# Patient Record
Sex: MALE | Race: OTHER
[De-identification: names, ages, dates, MRNs, and addresses within clinical notes are randomized per-mention and may not be internally consistent; named-entity substitution may affect disease eponyms.]

---

## 2019-03-03 ENCOUNTER — HOSPITAL ENCOUNTER (EMERGENCY)
Dept: HOSPITAL 72 - EMR | Age: 34
Discharge: HOME | End: 2019-03-03
Payer: MEDICAID

## 2019-03-03 VITALS — BODY MASS INDEX: 26.66 KG/M2 | HEIGHT: 65 IN | WEIGHT: 160 LBS

## 2019-03-03 VITALS — SYSTOLIC BLOOD PRESSURE: 124 MMHG | DIASTOLIC BLOOD PRESSURE: 73 MMHG

## 2019-03-03 VITALS — SYSTOLIC BLOOD PRESSURE: 123 MMHG | DIASTOLIC BLOOD PRESSURE: 71 MMHG

## 2019-03-03 DIAGNOSIS — R21: Primary | ICD-10-CM

## 2019-03-03 PROCEDURE — 99282 EMERGENCY DEPT VISIT SF MDM: CPT

## 2019-03-03 NOTE — EMERGENCY ROOM REPORT
History of Present Illness


General


Chief Complaint:  Skin Rash/Abscess


Source:  Patient





Present Illness


HPI


Patient is a 33-year-old male who presented after skin rash.  Patient was noted 

to have developed a rash after ingesting his wife's Diflucan.  Patient denied 

any fever.  He had a recent upper respiratory infection.  Patient reports 

having a generalized itchiness to his trunk.  Patient had taken Benadryl with 

minimal change.Patient had onset of rash over the past 2 days.


Allergies:  


Coded Allergies:  


     No Known Allergies (Unverified , 3/3/19)





Patient History


Past Medical History:  see triage record


Reviewed Nursing Documentation:  PMH: Agreed; PSxH: Agreed





Nursing Documentation-PMH


Past Medical History:  No Stated History





Review of Systems


All Other Systems:  negative except mentioned in HPI





Physical Exam





Vital Signs








  Date Time  Temp Pulse Resp B/P (MAP) Pulse Ox O2 Delivery O2 Flow Rate FiO2


 


3/3/19 19:49 98.2 76 16 126/74 96 Room Air  








General Appearance:  well appearing, no apparent distress, alert, GCS 15


Head:  normocephalic, atraumatic


ENT:  hearing grossly normal, normal voice


Neck:  full range of motion, supple


Respiratory:  no respiratory distress, speaking full sentences


Musculoskeletal:  no calf tenderness


Neurologic:  normal inspection, alert, oriented x3, normal gait


Psychiatric:  mood/affect normal


Skin:  other - patchy rash to trunk without urticaria





Medical Decision Making


Diagnostic Impression:  


 Primary Impression:  


 Skin rash


ER Course


.  Patient presented for itchy skin rash.  Differential diagnosis include was 

not limited to viral exanthem, pityriasis rosea, scabies, among others.  

Patient has a benign exam and does not appear to require any further imaging or 

laboratory testing at this time Patient was given oral steroids as well as 

prescription for Benadryl.  He is advised to follow-up with his primary care 

physician for recheck.  He is to return if any worsening of condition.





Last Vital Signs








  Date Time  Temp Pulse Resp B/P (MAP) Pulse Ox O2 Delivery O2 Flow Rate FiO2


 


3/3/19 20:27 98.2 74 16 124/73 96 Room Air  








Status:  improved


Disposition:  HOME, SELF-CARE


Condition:  Stable


Scripts


Diphenhydramine Hcl* (BENADRYL*) 25 Mg Capsule


25 MG ORAL Q6H PRN for Itching, #30 CAP


   Prov: Dillon Garcia MD         3/3/19 


Prednisone* (PREDNISONE*) 20 Mg Tablet


40 MG ORAL DAILY, #10 TAB


   Prov: Dillon Garcia MD         3/3/19


Referrals:  


NON PHYSICIAN (PCP)


Patient Instructions:  Dillon Marcano MD Mar 3, 2019 21:36

## 2019-03-03 NOTE — NUR
ED Nurse Note:

 Pt arrived ED from home. C/o skin rashes on back and neck area today. Pt is 
A/O X4. VSS.

Waing for orders.

## 2019-03-03 NOTE — NUR
ER DISCHARGE NOTE:

Patient is cleared to be discharged per Dr. Garcia. Pt is aox4, on room air, 
with stable vital signs. pt was given dc and prescription instructions, pt was 
able to verbalize understanding, pt id band 

removed . Pt is able to ambulate with steady gait. Pt took all belongings.

## 2019-11-17 ENCOUNTER — HOSPITAL ENCOUNTER (EMERGENCY)
Dept: HOSPITAL 72 - EMR | Age: 34
Discharge: HOME | End: 2019-11-17
Payer: MEDICAID

## 2019-11-17 VITALS — SYSTOLIC BLOOD PRESSURE: 125 MMHG | DIASTOLIC BLOOD PRESSURE: 82 MMHG

## 2019-11-17 VITALS — WEIGHT: 145 LBS | BODY MASS INDEX: 24.16 KG/M2 | HEIGHT: 65 IN

## 2019-11-17 VITALS — SYSTOLIC BLOOD PRESSURE: 121 MMHG | DIASTOLIC BLOOD PRESSURE: 80 MMHG

## 2019-11-17 DIAGNOSIS — D17.0: ICD-10-CM

## 2019-11-17 DIAGNOSIS — L73.9: Primary | ICD-10-CM

## 2019-11-17 PROCEDURE — 99282 EMERGENCY DEPT VISIT SF MDM: CPT

## 2019-11-17 NOTE — NUR
ED Nurse Note:



pt arrived to ed with complaitns of " 2 pimples" on top left forehead and 
posterior left nape. pt states pimples have been present for x 2 weeks, no 
drainage/pus.

## 2019-11-17 NOTE — EMERGENCY ROOM REPORT
History of Present Illness


General


Chief Complaint:  Skin Rash/Abscess


Source:  Patient





Present Illness


HPI


34-year-old male with no segment past medical history here complaining of 1 

week of a painful cyst on the left upper forehead around his hairline.  Patient 

reports that he shaved the area and started feeling the pain immediately.  Has 

been trying to pop the affected site.  Complains of headache now at the site of 

the affected area.  Denies dizziness, pain radiation, nausea vomiting.  Also 

reports a mobile mass on the left posterior neck times several weeks.  With 

minimal pain and no tingling numbness.  Denies fall or injury to the head or 

neck.  Has not taken medication for symptom relief.  Denies chest pain, 

shortness of breath, palpitation, and other associated symptoms.  Is up-to-date 

with tetanus shot


Allergies:  


Coded Allergies:  


     No Known Allergies (Unverified , 3/3/19)





Patient History


Past Medical History:  see triage record


Past Surgical History:  unable to obtain


Pertinent Family History:  none


Immunizations:  UTD


Reviewed Nursing Documentation:  PMH: Agreed; PSxH: Agreed





Nursing Documentation-PMH


Past Medical History:  No Stated History





Review of Systems


All Other Systems:  negative except mentioned in HPI





Physical Exam





Vital Signs








  Date Time  Temp Pulse Resp B/P (MAP) Pulse Ox O2 Delivery O2 Flow Rate FiO2


 


11/17/19 20:09 98.4 68 19 125/82 (96) 96 Room Air  








Sp02 EP Interpretation:  reviewed, normal


General Appearance:  no apparent distress, alert, GCS 15, non-toxic


Head:  normocephalic, atraumatic


Eyes:  bilateral eye normal inspection, bilateral eye PERRL


ENT:  hearing grossly normal, normal pharynx, no angioedema, normal voice


Neck:  full range of motion, supple, supple/symm/no masses


Respiratory:  chest non-tender, lungs clear, normal breath sounds, no rhonchi, 

no wheezing, speaking full sentences


Cardiovascular #1:  regular rate, rhythm, no edema, no murmur, normal capillary 

refill


Gastrointestinal:  normal bowel sounds, non tender, soft, no mass, non-distended

, no guarding, no rebound


Rectal:  deferred


Genitourinary:  no CVA tenderness


Neurologic:  alert, oriented x3, responsive, motor strength/tone normal, 

sensory intact, speech normal


Psychiatric:  normal inspection, judgement/insight normal, memory normal


Skin:  other - Folliculitis of left upper forehead, lipoma left posterior neck


Lymphatic:  normal inspection, no adenopathy





Medical Decision Making


PA Attestation


All diagnoses and treatment plans were reviewed and discussed with my 

supervising physician Dr. Silverman


Diagnostic Impression:  


 Primary Impression:  


 Folliculitis


 Additional Impression:  


 Lipoma


ER Course


34-year-old male with no segment past medical history here complaining of 1 

week of a painful cyst on the left upper forehead around his hairline.  Patient 

reports that he shaved the area and started feeling the pain immediately.  Has 

been trying to pop the affected site.  Complains of headache now at the site of 

the affected area.  Denies dizziness, pain radiation, nausea vomiting.  Also 

reports a mobile mass on the left posterior neck times several weeks.  With 

minimal pain and no tingling numbness.  Denies fall or injury to the head or 

neck.  Has not taken medication for symptom relief.  Denies chest pain, 

shortness of breath, palpitation, and other associated symptoms.  Is up-to-date 

with tetanus shot





Ddx considered but are not limited to : Folliculitis, cyst, abscess, lipoma





Vital signs: are WNL, pt. is afebrile





H&PE are most consistent with: Folliculitis, lipoma





ORDERS: Keflex, ibuprofen


ED INTERVENTIONS: None required at this time.








DISCHARGE: At this time pt. is stable for d/c to home. Will provide printed 

patient care instructions, and any necessary prescriptions. Care plan and 

follow up instructions have been discussed with the patient prior to discharge.

  I advised the patient to follow-up with primary care provider for possible 

scanning of the soft tissue of the possible lipoma posterior neck however even 

if it is for the colitis just like his left upper forehead the antibiotic will 

take care of it.  If worsening symptoms return to emergency room





Last Vital Signs








  Date Time  Temp Pulse Resp B/P (MAP) Pulse Ox O2 Delivery O2 Flow Rate FiO2


 


11/17/19 20:15 98.4 68 19 125/82 96 Room Air  








Disposition:  HOME, SELF-CARE


Condition:  Stable


Scripts


Cephalexin* (KEFLEX*) 500 Mg Capsule


500 MG ORAL EVERY 6 HOURS for 7 Days, #28 CAP


   Prov: Aren Magana         11/17/19 


Ibuprofen* (MOTRIN*) 600 Mg Tablet


600 MG ORAL Q6H PRN for For Pain, #30 TAB


   Prov: Aren Magana         11/17/19


Patient Instructions:  Folliculitis, Lipoma





Additional Instructions:  


Take medication as directed, follow-up with your primary care provider for 

removal of the light, you need to be referred to a specialist.  If worsening 

symptoms return to the emergency room











Aren Magana Nov 17, 2019 20:23